# Patient Record
Sex: MALE | Race: WHITE | Employment: FULL TIME | URBAN - METROPOLITAN AREA
[De-identification: names, ages, dates, MRNs, and addresses within clinical notes are randomized per-mention and may not be internally consistent; named-entity substitution may affect disease eponyms.]

---

## 2017-01-18 ENCOUNTER — HOSPITAL ENCOUNTER (OUTPATIENT)
Dept: LAB | Age: 42
Discharge: HOME | End: 2017-01-18
Attending: INTERNAL MEDICINE
Payer: COMMERCIAL

## 2017-01-18 DIAGNOSIS — E23.0 HYPOGONADOTROPIC HYPOGONADISM (HCC): ICD-10-CM

## 2017-01-18 PROCEDURE — 36415 COLL VENOUS BLD VENIPUNCTURE: CPT | Performed by: INTERNAL MEDICINE

## 2017-01-18 PROCEDURE — 84403 ASSAY OF TOTAL TESTOSTERONE: CPT | Performed by: INTERNAL MEDICINE

## 2017-01-19 LAB
COMMENT, TESC2: ABNORMAL
TESTOST FREE SERPL-MCNC: 3 PG/ML (ref 8.7–25.1)
TESTOST SERPL-MCNC: 183 NG/DL (ref 348–1197)

## 2018-05-03 PROBLEM — J32.1 CHRONIC FRONTAL SINUSITIS: Status: ACTIVE | Noted: 2018-05-03

## 2019-03-20 ENCOUNTER — HOSPITAL ENCOUNTER (OUTPATIENT)
Dept: LAB | Age: 44
Discharge: HOME | End: 2019-03-20
Payer: COMMERCIAL

## 2019-03-20 LAB — HGB BLD-MCNC: 16.9 G/DL (ref 13.6–17.2)

## 2019-03-20 PROCEDURE — 85018 HEMOGLOBIN: CPT

## 2019-03-20 PROCEDURE — 84403 ASSAY OF TOTAL TESTOSTERONE: CPT

## 2019-03-20 PROCEDURE — 36415 COLL VENOUS BLD VENIPUNCTURE: CPT

## 2019-03-20 PROCEDURE — 84146 ASSAY OF PROLACTIN: CPT

## 2019-03-21 LAB
PROLACTIN SERPL-MCNC: 12.2 NG/ML
TESTOST SERPL-MCNC: 481 NG/DL (ref 264–916)

## 2021-10-25 PROBLEM — R97.20 ELEVATED PSA: Status: ACTIVE | Noted: 2021-10-25

## 2022-03-18 PROBLEM — J32.1 CHRONIC FRONTAL SINUSITIS: Status: ACTIVE | Noted: 2018-05-03

## 2022-03-19 PROBLEM — R97.20 ELEVATED PSA: Status: ACTIVE | Noted: 2021-10-25

## 2022-06-28 ENCOUNTER — OFFICE VISIT (OUTPATIENT)
Dept: ENDOCRINOLOGY | Age: 47
End: 2022-06-28
Payer: COMMERCIAL

## 2022-06-28 VITALS
DIASTOLIC BLOOD PRESSURE: 88 MMHG | OXYGEN SATURATION: 97 % | HEART RATE: 72 BPM | SYSTOLIC BLOOD PRESSURE: 120 MMHG | WEIGHT: 209 LBS | BODY MASS INDEX: 29.99 KG/M2

## 2022-06-28 DIAGNOSIS — E22.1 HYPERPROLACTINEMIA (HCC): ICD-10-CM

## 2022-06-28 DIAGNOSIS — E23.0 HYPOGONADOTROPIC HYPOGONADISM (HCC): Primary | ICD-10-CM

## 2022-06-28 DIAGNOSIS — R97.20 ELEVATED PSA: ICD-10-CM

## 2022-06-28 PROCEDURE — 99214 OFFICE O/P EST MOD 30 MIN: CPT | Performed by: INTERNAL MEDICINE

## 2022-06-28 NOTE — PROGRESS NOTES
Silvio Carroll MD, [address] [place]            Reason for visit: follow-up of hypogonadism      ASSESSMENT AND PLAN:    1. Hypogonadotropic hypogonadism ([address]  He has a history of hypogonadism most recently treated with Isrrael Iba. Due to increased PSA, I had him discontinue it and referred him to Dr. Keisha Meadows with urology. PSA normalized following cessation. Fortunately, testosterone is now also normal on no treatment. As such, I do not recommend initiation of testosterone replacement at this time. I will plan to monitor testosterone, prolactin, and PSA off of treatment. - Testosterone Total Only, Male; Future    2. Hyperprolactinemia (HCC)  Previously, normalization of prolactin did not result in normalization of testosterone. Prolactin is currently normal on no therapy. - Prolactin; Future    3. Elevated PSA  This is now normal.  - PSA, Diagnostic; Future      Follow-up and Dispositions    · Return in about 6 months (around 12/28/2022). History of Present Illness:    PITUITARY DISEASE  Chelita Duke is here for follow-up of hyperprolactinemia and hypogonadism. This is currently treated with trandermal testosterone. He did not check requested labs prior to today's appointment.     Current symptoms: See review of systems below     Pertinent comorbidities: He experienced normal puberty concomitant with his peers. He has fathered 2 children.   He reports a history of abnormal semen analysis (possibly low motility) but was eventually able to achieve spontaneous pregnancy with his wife.     Imaging:  10/10/2014: MRI ([facility])- normal pituitary.     Laboratory evaluation:  7/3/2014 at 8:24 AM: Prolactin 14.7, testosterone 406, free testosterone 6.2 (reference 8.7-25.1), cortisol 14.8.  7/9/2014 at 8:42 AM: Prolactin 14.9, testosterone 432, free testosterone 5.7 (reference 8.7-25.1), LH 6.2, FSH 6.2.  9/17/2014 at 8:29 AM: Testosterone 324, free IM every week) in early November 2017. At patient request, this was changed to Androderm 4 mg/day patches in November 2018 and to Axiron 60 mg (2 actuations) daily in December 2018. Due to elevated PSA, Axiron was discontinued ~10/26/2021.       Review of Systems   Constitutional: Negative for fatigue and unexpected weight change (intentionally lost 18 pounds in ~3 months). Cardiovascular: Negative for palpitations. Genitourinary: Negative for difficulty urinating. Neurological: Negative for tremors. Psychiatric/Behavioral: Positive for decreased concentration (lack of focus). /88 (Site: Left Upper Arm, Position: Sitting)   Pulse 72   Wt 209 lb (94.8 kg)   SpO2 97%   BMI 29.99 kg/m²   Wt Readings from Last 3 Encounters:   06/28/22 209 lb (94.8 kg)   10/05/21 201 lb (91.2 kg)       Physical Exam  Constitutional:       Appearance: Normal appearance. HENT:      Head: Normocephalic. Neck:      Thyroid: No thyroid mass or thyromegaly. Cardiovascular:      Rate and Rhythm: Normal rate and regular rhythm. Pulmonary:      Effort: Pulmonary effort is normal.      Breath sounds: Normal breath sounds. Neurological:      Mental Status: He is alert. Psychiatric:         Mood and Affect: Mood normal.         Behavior: Behavior normal.         Orders Placed This Encounter   Procedures    Testosterone Total Only, Male     Standing Status:   Future     Standing Expiration Date:   6/28/2023    Prolactin     Standing Status:   Future     Standing Expiration Date:   6/28/2023    PSA, Diagnostic     Standing Status:   Future     Standing Expiration Date:   6/28/2023       Current Outpatient Medications   Medication Sig Dispense Refill    Multiple Vitamin (MULTIVITAMIN ADULT PO) Take by mouth      Cetirizine HCl (ZYRTEC ALLERGY) 10 MG CAPS Take 10 mg by mouth daily       No current facility-administered medications for this visit.        Gael Walden MD, FACE      Portions of this note were generated with the assistance of voice recognition software. As such, some errors in transcription may be present.